# Patient Record
Sex: FEMALE | Race: BLACK OR AFRICAN AMERICAN | NOT HISPANIC OR LATINO | Employment: OTHER | URBAN - METROPOLITAN AREA
[De-identification: names, ages, dates, MRNs, and addresses within clinical notes are randomized per-mention and may not be internally consistent; named-entity substitution may affect disease eponyms.]

---

## 2018-09-24 ENCOUNTER — HOSPITAL ENCOUNTER (EMERGENCY)
Facility: HOSPITAL | Age: 43
Discharge: HOME | End: 2018-09-24
Attending: EMERGENCY MEDICINE
Payer: MEDICAID

## 2018-09-24 VITALS
SYSTOLIC BLOOD PRESSURE: 115 MMHG | HEIGHT: 66 IN | HEART RATE: 97 BPM | WEIGHT: 293 LBS | DIASTOLIC BLOOD PRESSURE: 82 MMHG | RESPIRATION RATE: 18 BRPM | TEMPERATURE: 99 F | OXYGEN SATURATION: 97 % | BODY MASS INDEX: 47.09 KG/M2

## 2018-09-24 DIAGNOSIS — J40 BRONCHITIS: Primary | ICD-10-CM

## 2018-09-24 DIAGNOSIS — J45.21 MILD INTERMITTENT ASTHMA WITH EXACERBATION: ICD-10-CM

## 2018-09-24 DIAGNOSIS — R05.9 COUGH: ICD-10-CM

## 2018-09-24 LAB
ANION GAP SERPL CALC-SCNC: 11 MMOL/L
B-HCG UR QL: NEGATIVE
BASOPHILS # BLD AUTO: 0.01 K/UL
BASOPHILS NFR BLD: 0.2 %
BUN SERPL-MCNC: 8 MG/DL
CALCIUM SERPL-MCNC: 9.5 MG/DL
CHLORIDE SERPL-SCNC: 100 MMOL/L
CO2 SERPL-SCNC: 27 MMOL/L
CREAT SERPL-MCNC: 0.8 MG/DL
CTP QC/QA: YES
DIFFERENTIAL METHOD: ABNORMAL
EOSINOPHIL # BLD AUTO: 0.3 K/UL
EOSINOPHIL NFR BLD: 4.5 %
ERYTHROCYTE [DISTWIDTH] IN BLOOD BY AUTOMATED COUNT: 16.1 %
EST. GFR  (AFRICAN AMERICAN): >60 ML/MIN/1.73 M^2
EST. GFR  (NON AFRICAN AMERICAN): >60 ML/MIN/1.73 M^2
GLUCOSE SERPL-MCNC: 90 MG/DL
HCT VFR BLD AUTO: 36.8 %
HGB BLD-MCNC: 11.5 G/DL
LYMPHOCYTES # BLD AUTO: 1.5 K/UL
LYMPHOCYTES NFR BLD: 26.9 %
MCH RBC QN AUTO: 27 PG
MCHC RBC AUTO-ENTMCNC: 31.3 G/DL
MCV RBC AUTO: 86 FL
MONOCYTES # BLD AUTO: 0.4 K/UL
MONOCYTES NFR BLD: 6.3 %
NEUTROPHILS # BLD AUTO: 3.4 K/UL
NEUTROPHILS NFR BLD: 61.9 %
PLATELET # BLD AUTO: 292 K/UL
PMV BLD AUTO: 9.8 FL
POCT GLUCOSE: 103 MG/DL (ref 70–110)
POTASSIUM SERPL-SCNC: 3.3 MMOL/L
RBC # BLD AUTO: 4.26 M/UL
SODIUM SERPL-SCNC: 138 MMOL/L
WBC # BLD AUTO: 5.54 K/UL

## 2018-09-24 PROCEDURE — 94761 N-INVAS EAR/PLS OXIMETRY MLT: CPT

## 2018-09-24 PROCEDURE — 25000003 PHARM REV CODE 250: Performed by: NURSE PRACTITIONER

## 2018-09-24 PROCEDURE — 80048 BASIC METABOLIC PNL TOTAL CA: CPT

## 2018-09-24 PROCEDURE — 82962 GLUCOSE BLOOD TEST: CPT

## 2018-09-24 PROCEDURE — 85025 COMPLETE CBC W/AUTO DIFF WBC: CPT

## 2018-09-24 PROCEDURE — 99284 EMERGENCY DEPT VISIT MOD MDM: CPT | Mod: 25

## 2018-09-24 PROCEDURE — 81025 URINE PREGNANCY TEST: CPT | Performed by: NURSE PRACTITIONER

## 2018-09-24 PROCEDURE — 25000242 PHARM REV CODE 250 ALT 637 W/ HCPCS: Performed by: NURSE PRACTITIONER

## 2018-09-24 PROCEDURE — 87040 BLOOD CULTURE FOR BACTERIA: CPT

## 2018-09-24 PROCEDURE — 94640 AIRWAY INHALATION TREATMENT: CPT

## 2018-09-24 RX ORDER — METFORMIN HYDROCHLORIDE 500 MG/1
500 TABLET ORAL 2 TIMES DAILY WITH MEALS
COMMUNITY

## 2018-09-24 RX ORDER — ALBUTEROL SULFATE 2.5 MG/.5ML
2.5 SOLUTION RESPIRATORY (INHALATION) EVERY 4 HOURS PRN
Qty: 12 EACH | Refills: 0 | Status: SHIPPED | OUTPATIENT
Start: 2018-09-24 | End: 2019-09-24

## 2018-09-24 RX ORDER — BENZONATATE 100 MG/1
100 CAPSULE ORAL 3 TIMES DAILY PRN
Qty: 20 CAPSULE | Refills: 0 | Status: SHIPPED | OUTPATIENT
Start: 2018-09-24 | End: 2018-10-04

## 2018-09-24 RX ORDER — IPRATROPIUM BROMIDE AND ALBUTEROL SULFATE 2.5; .5 MG/3ML; MG/3ML
3 SOLUTION RESPIRATORY (INHALATION)
Status: COMPLETED | OUTPATIENT
Start: 2018-09-24 | End: 2018-09-24

## 2018-09-24 RX ORDER — LORATADINE 10 MG/1
10 TABLET ORAL DAILY
Refills: 0 | COMMUNITY
Start: 2018-09-24 | End: 2019-09-24

## 2018-09-24 RX ORDER — ACETAMINOPHEN 500 MG
500 TABLET ORAL
Status: COMPLETED | OUTPATIENT
Start: 2018-09-24 | End: 2018-09-24

## 2018-09-24 RX ORDER — AZITHROMYCIN 250 MG/1
TABLET, FILM COATED ORAL
Qty: 6 TABLET | Refills: 0 | Status: SHIPPED | OUTPATIENT
Start: 2018-09-24

## 2018-09-24 RX ADMIN — IPRATROPIUM BROMIDE AND ALBUTEROL SULFATE 3 ML: .5; 3 SOLUTION RESPIRATORY (INHALATION) at 09:09

## 2018-09-24 RX ADMIN — ACETAMINOPHEN 500 MG: 500 TABLET ORAL at 07:09

## 2018-09-24 NOTE — ED PROVIDER NOTES
"Encounter Date: 2018       History     Chief Complaint   Patient presents with    Cough     43 year old female presents to ed cc of cough and congestion x 1 week. patient states prodcutive cough green sputum     43-year-old female with past medical history of asthma, hypertension, and HIV presents to the ED for a cough.  She reports a productive cough for about a week.  The cough is productive of yellow-green sputum.  She has been using her daughter's nebulizer without improvement.  She reports that her hand-held albuterol inhaler is not helping.  She reports chest pain with cough only.  No shortness of breath. No fever or chills. No vomiting or diarrhea.  She reports compliance with her HIV medications, but does not recall her last counts.  She states, "they told me it was good."  She is scheduled to see her HIV doctor on .  No known sick contacts.  No other complaints at this time. Pt is non-smoker.       The history is provided by the patient and medical records.   Cough   This is a new problem. The current episode started several days ago. The problem occurs every few minutes. The problem has been unchanged. The cough is productive of sputum. There has been no fever. Associated symptoms include chest pain (with cough only) and rhinorrhea. Pertinent negatives include no chills, no sweats, no headaches, no sore throat, no myalgias, no shortness of breath and no wheezing. Treatments tried: nebulizer. The treatment provided no relief. She is not a smoker. Her past medical history is significant for asthma. Her past medical history does not include COPD.     Review of patient's allergies indicates:  No Known Allergies  Past Medical History:   Diagnosis Date    Asthma     HIV disease     HIV positive     Hypertension      Past Surgical History:   Procedure Laterality Date     SECTION, CLASSIC       Family History   Problem Relation Age of Onset    Hypertension Mother     Diabetes " Mother     Hypertension Father     Diabetes Father      Social History     Tobacco Use    Smoking status: Current Every Day Smoker    Smokeless tobacco: Never Used   Substance Use Topics    Alcohol use: No    Drug use: No     Review of Systems   Constitutional: Positive for fatigue. Negative for activity change, appetite change, chills and fever.   HENT: Positive for congestion and rhinorrhea. Negative for sore throat.    Respiratory: Positive for cough. Negative for shortness of breath and wheezing.    Cardiovascular: Positive for chest pain (with cough only). Negative for palpitations and leg swelling.   Gastrointestinal: Negative for abdominal pain, diarrhea and vomiting.   Genitourinary: Negative for dysuria.   Musculoskeletal: Negative for back pain and myalgias.   Skin: Negative for rash.   Allergic/Immunologic: Positive for immunocompromised state.   Neurological: Negative for dizziness, weakness and headaches.   Hematological: Does not bruise/bleed easily.   Psychiatric/Behavioral: Negative for confusion.       Physical Exam     Initial Vitals [09/24/18 0707]   BP Pulse Resp Temp SpO2   134/76 97 20 99.4 °F (37.4 °C) 96 %      MAP       --         Physical Exam    Nursing note and vitals reviewed.  Constitutional: Vital signs are normal. She appears well-developed and well-nourished. She is Obese . She is active and cooperative. She is easily aroused.  Non-toxic appearance. She does not have a sickly appearance. She does not appear ill.   HENT:   Head: Normocephalic and atraumatic.   Right Ear: External ear normal.   Left Ear: External ear normal.   Nose: Rhinorrhea present.   Mouth/Throat: Uvula is midline, oropharynx is clear and moist and mucous membranes are normal.   Eyes: Conjunctivae and EOM are normal.   Neck: Normal range of motion. Neck supple.   Cardiovascular: Normal rate, regular rhythm and normal heart sounds.   Pulmonary/Chest: Effort normal. No accessory muscle usage. No tachypnea. She  has wheezes (few end expiratory bilaterally in bases).   Abdominal: Soft. Normal appearance and bowel sounds are normal. She exhibits no distension. There is no tenderness. There is no rigidity, no rebound and no guarding.   Musculoskeletal:        Right lower leg: Normal.        Left lower leg: Normal.   Neurological: She is alert, oriented to person, place, and time and easily aroused. She has normal strength. GCS eye subscore is 4. GCS verbal subscore is 5. GCS motor subscore is 6.   Skin: Skin is warm, dry and intact. No bruising and no rash noted. No erythema.   Psychiatric: She has a normal mood and affect. Her speech is normal and behavior is normal. Judgment and thought content normal. Cognition and memory are normal.         ED Course   Procedures  Labs Reviewed   CBC W/ AUTO DIFFERENTIAL - Abnormal; Notable for the following components:       Result Value    Hemoglobin 11.5 (*)     Hematocrit 36.8 (*)     MCHC 31.3 (*)     RDW 16.1 (*)     All other components within normal limits   BASIC METABOLIC PANEL - Abnormal; Notable for the following components:    Potassium 3.3 (*)     All other components within normal limits   CULTURE, BLOOD   CULTURE, BLOOD   POCT URINE PREGNANCY   POCT GLUCOSE          Imaging Results          X-Ray Chest PA And Lateral (Final result)  Result time 09/24/18 08:08:44    Final result by Stewart Verduzco MD (09/24/18 08:08:44)                 Impression:      No acute cardiopulmonary process.  No interval worsening.      Electronically signed by: Stewart Verduzco MD  Date:    09/24/2018  Time:    08:08             Narrative:    EXAMINATION:  XR CHEST PA AND LATERAL    CLINICAL HISTORY:  Cough    TECHNIQUE:  PA and lateral views of the chest were performed.    COMPARISON:  03/14/2015    FINDINGS:  Trachea is patent.  Cardiac silhouette appears normal in size.  Lung volumes are low.  No consolidation, mass, effusion, pneumothorax or free air below the diaphragm.  Osseous structures  "appear unremarkable.                                 Medical Decision Making:   Initial Assessment:   43-year-old female with past medical history of asthma, HIV, and hypertension presents ED for productive cough for 1 week.  No fever or chills. She reports no improvement with her albuterol inhaler, but very mild improvement with her daughter's nebulizer.  No vomiting or diarrhea.  She reports compliance with her HIV medications and that her last counts were "good."  Patient appears well, nontoxic.  Vitals stable.  No respiratory distress.  Lungs with faint expiratory wheezing in the bases bilaterally. Legs normal.  Differential Diagnosis:   URI, bronchitis, pneumonia, pleural effusion, CHF  Clinical Tests:   Lab Tests: Ordered and Reviewed  Radiological Study: Ordered and Reviewed  ED Management:  Labs, cxr, DuoNeb, po tylenol  Potassium mildly low at 3.3, but patient wanted to leave prior to receiving treatment as she has to  her children.  She denies chest pain and shortness of breath. She reports that the DuoNeb significantly helped her symptoms.  X-rays negative for infiltrate and acute change.  Feel the patient has bronchitis but due to history of immunosuppression, length of symptoms, and history of asthma, I will prescribe antibiotics.  I advised her to increase her fluid intake and follow up with her PCP within 2-3 days.  I reviewed strict return precautions including fever, chills, vomiting, chest pain, shortness of breath, or if she has any questions or concerns.  The patient has a nebulizer at home.  She does have her MDI inhaler as well. Patient verbalized understanding, compliance, and agreement with the treatment plan.  Rx Zyrtec, Zithromax, Tessalon Perles, albuterol nebulizer solution                      Clinical Impression:   The primary encounter diagnosis was Bronchitis. Diagnoses of Cough and Mild intermittent asthma with exacerbation were also pertinent to this visit.       "                       Lilliam Sultana, [facility]  09/24/18 1501

## 2018-09-24 NOTE — ED NOTES
APPEARANCE: Alert, oriented and in no acute distress.  CARDIAC: Normal rate and rhythm   PERIPHERAL VASCULAR: peripheral pulses present. Normal cap refill. No edema. Warm to touch.    RESPIRATORY: Respirations are equal and unlabored,+Cough noted   GASTRO: soft, bowel sounds normal, no tenderness, no abdominal distention.  MUSC: Full ROM. No bony tenderness or soft tissue tenderness. No obvious deformity.  SKIN: Skin is warm and dry, normal skin turgor, mucous membranes moist.  NEURO: 5/5 strength major flexors/extensors bilaterally. Sensory intact to light touch bilaterally. Henderson coma scale: eyes open spontaneously-4, oriented & converses-5, obeys commands-6. No neurological abnormalities.   MENTAL STATUS: awake, alert and aware of environment.  EYE: PERRL, both eyes: pupils brisk and reactive to light. Normal size..

## 2018-09-29 LAB
BACTERIA BLD CULT: NORMAL
BACTERIA BLD CULT: NORMAL

## 2021-04-18 ENCOUNTER — IMMUNIZATION (OUTPATIENT)
Dept: PRIMARY CARE CLINIC | Facility: CLINIC | Age: 46
End: 2021-04-18
Payer: MEDICAID

## 2021-04-18 DIAGNOSIS — Z23 NEED FOR VACCINATION: Primary | ICD-10-CM

## 2021-04-18 PROCEDURE — 0001A PR IMMUNIZ ADMIN, SARS-COV-2 COVID-19 VACC, 30MCG/0.3ML, 1ST DOSE: ICD-10-PCS | Mod: CV19,S$GLB,, | Performed by: INTERNAL MEDICINE

## 2021-04-18 PROCEDURE — 91300 PR SARS-COV- 2 COVID-19 VACCINE, NO PRSV, 30MCG/0.3ML, IM: ICD-10-PCS | Mod: S$GLB,,, | Performed by: INTERNAL MEDICINE

## 2021-04-18 PROCEDURE — 0001A PR IMMUNIZ ADMIN, SARS-COV-2 COVID-19 VACC, 30MCG/0.3ML, 1ST DOSE: CPT | Mod: CV19,S$GLB,, | Performed by: INTERNAL MEDICINE

## 2021-04-18 PROCEDURE — 91300 PR SARS-COV- 2 COVID-19 VACCINE, NO PRSV, 30MCG/0.3ML, IM: CPT | Mod: S$GLB,,, | Performed by: INTERNAL MEDICINE

## 2021-04-18 RX ADMIN — Medication 0.3 ML: at 01:04

## 2021-05-08 ENCOUNTER — IMMUNIZATION (OUTPATIENT)
Dept: PRIMARY CARE CLINIC | Facility: CLINIC | Age: 46
End: 2021-05-08
Payer: MEDICAID

## 2021-05-08 DIAGNOSIS — Z23 NEED FOR VACCINATION: Primary | ICD-10-CM

## 2021-05-08 PROCEDURE — 0002A PR IMMUNIZ ADMIN, SARS-COV-2 COVID-19 VACC, 30MCG/0.3ML, 2ND DOSE: CPT | Mod: PBBFAC

## 2021-05-08 PROCEDURE — 91300 PR SARS-COV- 2 COVID-19 VACCINE, NO PRSV, 30MCG/0.3ML, IM: CPT | Mod: PBBFAC

## 2021-05-08 RX ADMIN — RNA INGREDIENT BNT-162B2 0.3 ML: 0.23 INJECTION, SUSPENSION INTRAMUSCULAR at 10:05

## 2021-11-15 ENCOUNTER — IMMUNIZATION (OUTPATIENT)
Dept: PRIMARY CARE CLINIC | Facility: CLINIC | Age: 46
End: 2021-11-15
Payer: MEDICAID

## 2021-11-15 DIAGNOSIS — Z23 NEED FOR VACCINATION: Primary | ICD-10-CM

## 2021-11-15 PROCEDURE — 0004A COVID-19, MRNA, LNP-S, PF, 30 MCG/0.3 ML DOSE VACCINE: CPT | Mod: CV19,PBBFAC | Performed by: INTERNAL MEDICINE

## 2022-12-05 NOTE — DISCHARGE INSTRUCTIONS
Increase your fluid intake.  Return to the ED if your condition changes, progresses, or if you have any concerns.     yes

## 2023-06-05 ENCOUNTER — HOSPITAL ENCOUNTER (EMERGENCY)
Facility: HOSPITAL | Age: 48
Discharge: HOME | End: 2023-06-05
Attending: EMERGENCY MEDICINE
Payer: MEDICAID

## 2023-06-05 VITALS
HEART RATE: 76 BPM | RESPIRATION RATE: 15 BRPM | SYSTOLIC BLOOD PRESSURE: 135 MMHG | HEIGHT: 66 IN | WEIGHT: 293 LBS | DIASTOLIC BLOOD PRESSURE: 81 MMHG | TEMPERATURE: 98 F | OXYGEN SATURATION: 100 % | BODY MASS INDEX: 47.09 KG/M2

## 2023-06-05 DIAGNOSIS — G43.109 COMPLICATED MIGRAINE: ICD-10-CM

## 2023-06-05 DIAGNOSIS — R20.2 PARESTHESIAS: ICD-10-CM

## 2023-06-05 DIAGNOSIS — R51.9 ACUTE NONINTRACTABLE HEADACHE, UNSPECIFIED HEADACHE TYPE: Primary | ICD-10-CM

## 2023-06-05 DIAGNOSIS — I10 HYPERTENSION, UNSPECIFIED TYPE: ICD-10-CM

## 2023-06-05 DIAGNOSIS — R47.1 DYSARTHRIA: ICD-10-CM

## 2023-06-05 LAB
ALBUMIN SERPL BCP-MCNC: 3.3 G/DL (ref 3.5–5.2)
ALLENS TEST: NORMAL
ALLENS TEST: NORMAL
ALP SERPL-CCNC: 80 U/L (ref 55–135)
ALT SERPL W/O P-5'-P-CCNC: 13 U/L (ref 10–44)
ANION GAP SERPL CALC-SCNC: 7 MMOL/L (ref 8–16)
AST SERPL-CCNC: 13 U/L (ref 10–40)
BASOPHILS # BLD AUTO: 0.02 K/UL (ref 0–0.2)
BASOPHILS NFR BLD: 0.3 % (ref 0–1.9)
BILIRUB SERPL-MCNC: 0.1 MG/DL (ref 0.1–1)
BUN SERPL-MCNC: 8 MG/DL (ref 6–20)
CALCIUM SERPL-MCNC: 9.6 MG/DL (ref 8.7–10.5)
CHLORIDE SERPL-SCNC: 107 MMOL/L (ref 95–110)
CHOLEST SERPL-MCNC: 204 MG/DL (ref 120–199)
CHOLEST/HDLC SERPL: 3.4 {RATIO} (ref 2–5)
CO2 SERPL-SCNC: 28 MMOL/L (ref 23–29)
CREAT SERPL-MCNC: 0.9 MG/DL (ref 0.5–1.4)
CREAT SERPL-MCNC: 0.9 MG/DL (ref 0.5–1.4)
DELSYS: NORMAL
DELSYS: NORMAL
DIFFERENTIAL METHOD: ABNORMAL
EOSINOPHIL # BLD AUTO: 0.2 K/UL (ref 0–0.5)
EOSINOPHIL NFR BLD: 2.7 % (ref 0–8)
ERYTHROCYTE [DISTWIDTH] IN BLOOD BY AUTOMATED COUNT: 16.1 % (ref 11.5–14.5)
EST. GFR  (NO RACE VARIABLE): >60 ML/MIN/1.73 M^2
GLUCOSE SERPL-MCNC: 102 MG/DL (ref 70–110)
HCT VFR BLD AUTO: 35.8 % (ref 37–48.5)
HDLC SERPL-MCNC: 60 MG/DL (ref 40–75)
HDLC SERPL: 29.4 % (ref 20–50)
HGB BLD-MCNC: 11.1 G/DL (ref 12–16)
IMM GRANULOCYTES # BLD AUTO: 0.02 K/UL (ref 0–0.04)
IMM GRANULOCYTES NFR BLD AUTO: 0.3 % (ref 0–0.5)
INR PPP: 1 (ref 0.8–1.2)
LDLC SERPL CALC-MCNC: 130.6 MG/DL (ref 63–159)
LYMPHOCYTES # BLD AUTO: 3.2 K/UL (ref 1–4.8)
LYMPHOCYTES NFR BLD: 53.1 % (ref 18–48)
MCH RBC QN AUTO: 26.1 PG (ref 27–31)
MCHC RBC AUTO-ENTMCNC: 31 G/DL (ref 32–36)
MCV RBC AUTO: 84 FL (ref 82–98)
MODE: NORMAL
MODE: NORMAL
MONOCYTES # BLD AUTO: 0.5 K/UL (ref 0.3–1)
MONOCYTES NFR BLD: 7.5 % (ref 4–15)
NEUTROPHILS # BLD AUTO: 2.2 K/UL (ref 1.8–7.7)
NEUTROPHILS NFR BLD: 36.1 % (ref 38–73)
NONHDLC SERPL-MCNC: 144 MG/DL
NRBC BLD-RTO: 0 /100 WBC
PLATELET # BLD AUTO: 298 K/UL (ref 150–450)
PMV BLD AUTO: 10.5 FL (ref 9.2–12.9)
POC PTINR: 1.1 (ref 0.9–1.2)
POCT GLUCOSE: 89 MG/DL (ref 70–110)
POTASSIUM SERPL-SCNC: 3.9 MMOL/L (ref 3.5–5.1)
PROT SERPL-MCNC: 7.8 G/DL (ref 6–8.4)
PROTHROMBIN TIME: 10.6 SEC (ref 9–12.5)
RBC # BLD AUTO: 4.25 M/UL (ref 4–5.4)
SAMPLE: NORMAL
SAMPLE: NORMAL
SITE: NORMAL
SITE: NORMAL
SODIUM SERPL-SCNC: 142 MMOL/L (ref 136–145)
TRIGL SERPL-MCNC: 67 MG/DL (ref 30–150)
TSH SERPL DL<=0.005 MIU/L-ACNC: 3.84 UIU/ML (ref 0.4–4)
WBC # BLD AUTO: 5.97 K/UL (ref 3.9–12.7)

## 2023-06-05 PROCEDURE — 99499 UNLISTED E&M SERVICE: CPT | Mod: ,,, | Performed by: PSYCHIATRY & NEUROLOGY

## 2023-06-05 PROCEDURE — 93005 ELECTROCARDIOGRAM TRACING: CPT

## 2023-06-05 PROCEDURE — 99204 OFFICE O/P NEW MOD 45 MIN: CPT | Mod: 95,,, | Performed by: PSYCHIATRY & NEUROLOGY

## 2023-06-05 PROCEDURE — 99900035 HC TECH TIME PER 15 MIN (STAT)

## 2023-06-05 PROCEDURE — 85610 PROTHROMBIN TIME: CPT

## 2023-06-05 PROCEDURE — 99285 EMERGENCY DEPT VISIT HI MDM: CPT | Mod: 25

## 2023-06-05 PROCEDURE — 93010 ELECTROCARDIOGRAM REPORT: CPT | Mod: ,,, | Performed by: INTERNAL MEDICINE

## 2023-06-05 PROCEDURE — 99499 NO LOS: ICD-10-PCS | Mod: ,,, | Performed by: PSYCHIATRY & NEUROLOGY

## 2023-06-05 PROCEDURE — 80053 COMPREHEN METABOLIC PANEL: CPT | Performed by: EMERGENCY MEDICINE

## 2023-06-05 PROCEDURE — 80061 LIPID PANEL: CPT | Performed by: EMERGENCY MEDICINE

## 2023-06-05 PROCEDURE — 63600175 PHARM REV CODE 636 W HCPCS: Performed by: EMERGENCY MEDICINE

## 2023-06-05 PROCEDURE — 96361 HYDRATE IV INFUSION ADD-ON: CPT

## 2023-06-05 PROCEDURE — 25500020 PHARM REV CODE 255: Performed by: EMERGENCY MEDICINE

## 2023-06-05 PROCEDURE — 93010 EKG 12-LEAD: ICD-10-PCS | Mod: ,,, | Performed by: INTERNAL MEDICINE

## 2023-06-05 PROCEDURE — 85610 PROTHROMBIN TIME: CPT | Performed by: EMERGENCY MEDICINE

## 2023-06-05 PROCEDURE — 82565 ASSAY OF CREATININE: CPT | Mod: 91

## 2023-06-05 PROCEDURE — 96375 TX/PRO/DX INJ NEW DRUG ADDON: CPT | Mod: 59

## 2023-06-05 PROCEDURE — 96374 THER/PROPH/DIAG INJ IV PUSH: CPT | Mod: 59

## 2023-06-05 PROCEDURE — 82962 GLUCOSE BLOOD TEST: CPT

## 2023-06-05 PROCEDURE — 25000003 PHARM REV CODE 250: Performed by: EMERGENCY MEDICINE

## 2023-06-05 PROCEDURE — 85025 COMPLETE CBC W/AUTO DIFF WBC: CPT | Performed by: EMERGENCY MEDICINE

## 2023-06-05 PROCEDURE — 84443 ASSAY THYROID STIM HORMONE: CPT | Performed by: EMERGENCY MEDICINE

## 2023-06-05 PROCEDURE — 99204 PR OFFICE/OUTPT VISIT, NEW, LEVL IV, 45-59 MIN: ICD-10-PCS | Mod: 95,,, | Performed by: PSYCHIATRY & NEUROLOGY

## 2023-06-05 RX ORDER — NAPROXEN SODIUM 220 MG/1
81 TABLET, FILM COATED ORAL DAILY
Status: DISCONTINUED | OUTPATIENT
Start: 2023-06-05 | End: 2023-06-05

## 2023-06-05 RX ORDER — ASPIRIN 81 MG/1
81 TABLET ORAL DAILY
Qty: 30 TABLET | Refills: 0 | Status: SHIPPED | OUTPATIENT
Start: 2023-06-05

## 2023-06-05 RX ORDER — PROCHLORPERAZINE EDISYLATE 5 MG/ML
5 INJECTION INTRAMUSCULAR; INTRAVENOUS
Status: COMPLETED | OUTPATIENT
Start: 2023-06-05 | End: 2023-06-05

## 2023-06-05 RX ORDER — DIPHENHYDRAMINE HYDROCHLORIDE 50 MG/ML
12.5 INJECTION INTRAMUSCULAR; INTRAVENOUS
Status: COMPLETED | OUTPATIENT
Start: 2023-06-05 | End: 2023-06-05

## 2023-06-05 RX ADMIN — DIPHENHYDRAMINE HYDROCHLORIDE 12.5 MG: 50 INJECTION INTRAMUSCULAR; INTRAVENOUS at 03:06

## 2023-06-05 RX ADMIN — PROCHLORPERAZINE EDISYLATE 5 MG: 5 INJECTION INTRAMUSCULAR; INTRAVENOUS at 03:06

## 2023-06-05 RX ADMIN — IOHEXOL 100 ML: 350 INJECTION, SOLUTION INTRAVENOUS at 03:06

## 2023-06-05 RX ADMIN — SODIUM CHLORIDE 250 ML: 0.9 INJECTION, SOLUTION INTRAVENOUS at 03:06

## 2023-06-05 NOTE — SUBJECTIVE & OBJECTIVE
"  Woke up with symptoms?: no    Recent bleeding noted: no  Does the patient take any Blood Thinners? no  Medications: No relevant medications      Past Medical History: no relevant history    Past Surgical History: no major surgeries within the last 2 weeks    Family History: no relevant history    Social History: unable to obtain    Allergies: No Known Allergies No relevant allergies    Review of Systems  Objective:   Vitals: Blood pressure (!) 199/123, pulse 80, temperature 98 °F (36.7 °C), temperature source Oral, resp. rate 20, height 5' 6" (1.676 m), weight 136.1 kg (300 lb), SpO2 98 %. BP: 199/123    CT READ: Yes  No hemmorhage. No mass effect. No early infarct signs.     Physical Exam      "

## 2023-06-05 NOTE — ED PROVIDER NOTES
Emergency Department Encounter  Provider Note    Lexy Lowe  [id number]  2023    Evaluation:    History:     Chief Complaint   Patient presents with    Aphasia     Tongue feels heavy, hard to speak and headache that started around 10 pm.    Headache       History of Present Illness:  Lexy Lowe is a 48 y.o. female who has a past medical history of Asthma, HIV disease, HIV positive, and Hypertension.    The patient presents to the ED due to headache, slurred speech, weakness, tingling/numbness.    Patient presents with family, who state patient began complaining of headache around 22:00 yesterday evening.     On arrival, patient expressing slurred speech as well as R-sided tingling and weakness in her R arm. She states she feels tired. She is not sure if she has ever had symptoms like this before.    Patient is a very poor historian. History is limited. Chart review completed.         Past Medical History:   Diagnosis Date    Asthma     HIV disease     HIV positive     Hypertension      Past Surgical History:   Procedure Laterality Date     SECTION, CLASSIC       Family History   Problem Relation Age of Onset    Hypertension Mother     Diabetes Mother     Hypertension Father     Diabetes Father      Social History     Socioeconomic History    Marital status: Single   Tobacco Use    Smoking status: Every Day    Smokeless tobacco: Never   Substance and Sexual Activity    Alcohol use: No    Drug use: No     Review of patient's allergies indicates:  No Known Allergies    Review of Systems   Neurological:  Positive for weakness and headaches.     Physical Exam:     Initial Vitals [23 0259]   BP Pulse Resp Temp SpO2   (!) 199/123 80 20 98 °F (36.7 °C) 98 %      MAP       --         Physical Exam    Nursing note and vitals reviewed.  Constitutional: She appears well-developed and well-nourished. She is not diaphoretic. She appears distressed.   HENT:   Head: Normocephalic and atraumatic.    Mouth/Throat: Oropharynx is clear and moist.   Eyes: EOM are normal. Pupils are equal, round, and reactive to light.   Neck: No tracheal deviation present.   Cardiovascular:  Normal rate, regular rhythm, normal heart sounds and intact distal pulses.           Pulmonary/Chest: Breath sounds normal. No stridor. No respiratory distress. She has no wheezes.   Abdominal: Abdomen is soft. Bowel sounds are normal. She exhibits no distension and no mass. There is no abdominal tenderness.   Musculoskeletal:         General: No edema. Normal range of motion.     Neurological: She is alert and oriented to person, place, and time. She has normal strength. She is not disoriented. A cranial nerve deficit is present. No sensory deficit. She exhibits normal muscle tone. Coordination normal. GCS eye subscore is 4. GCS verbal subscore is 5. GCS motor subscore is 6.   Speech slightly slurred, though patient does seem to have periods of clear speech between slurred and dysarthric speech.  No facial droop.   Squinting eyes closed constantly during exam.   Very poor effort on strength testing, but appears to move all extremities with symmetric strength and without drift.  Endorses subjective diminished sensation to R arm and R leg.    Skin: Skin is warm and dry. Capillary refill takes less than 2 seconds. No pallor.   Psychiatric: She has a normal mood and affect. Her behavior is normal. Thought content normal.       ED Course:   Critical Care    Date/Time: 6/5/2023 4:53 AM  Performed by: Mike Camacho MD  Authorized by: Mike Camacho MD   Total critical care time (exclusive of procedural time) : 45 minutes  Critical care was necessary to treat or prevent imminent or life-threatening deterioration of the following conditions: acute neurologic deficit, code stroke.  Critical care was time spent personally by me on the following activities: discussions with consultants, examination of patient, obtaining history from patient or  surrogate, ordering and performing treatments and interventions, ordering and review of laboratory studies, ordering and review of radiographic studies, pulse oximetry and re-evaluation of patient's condition.        Medical Decision Making:    History Acquisition:   Additional historians utilized:  None     Prior medical records were reviewed:   ID visit 11/2022 for f/u HIV  Optometry visit 04/2019 for myopia  Podiatry visit 09/2018 for plantar fasciitis    The patient's list of active medical problems, social history, medications, and allergies as documented has been reviewed.     Differential Diagnoses:   Based on available information and initial assessment, Differential Diagnosis includes, but is not limited to:  CVA/TIA, intracranial mass/hemorrhage, head trauma, seizure, status epilepticus, post-ictal state, meningitis/encephalitis, sepsis, MI/ACS, arrhythmia, syncope,   anaphylaxis, thyroid disease, neuroleptic malignant syndrome, serotonin syndrome, CO poisoning, hypoxia/hypercapnea, uremic/hepatic encephalopathy, medication reaction, intentional overdose, metabolic derangement, psychiatric disturbance, substance abuse, alcohol intoxication/withdrawal, hypoglycemia/hyperglycemia, complicated migraine.        EKG:   EKG interpretation by ED attending physician:  NSR, rate 74, no ST changes, no ischemia, normal intervals.  Compared with prior EKG dated 06/2015, grossly stable without significant change.      Labs:     Labs Reviewed   CBC W/ AUTO DIFFERENTIAL - Abnormal; Notable for the following components:       Result Value    Hemoglobin 11.1 (*)     Hematocrit 35.8 (*)     MCH 26.1 (*)     MCHC 31.0 (*)     RDW 16.1 (*)     Gran % 36.1 (*)     Lymph % 53.1 (*)     All other components within normal limits   COMPREHENSIVE METABOLIC PANEL - Abnormal; Notable for the following components:    Albumin 3.3 (*)     Anion Gap 7 (*)     All other components within normal limits   LIPID PANEL - Abnormal; Notable for  the following components:    Cholesterol 204 (*)     All other components within normal limits   PROTIME-INR   TSH   POCT GLUCOSE   ISTAT PROCEDURE   ISTAT CREATININE     Independent review of the labs ordered include:   See ED course    Imaging:     Imaging Results              CTA Head and Neck (xpd) (Final result)  Result time 06/05/23 04:56:45      Final result by Víctor Amaya MD (06/05/23 04:56:45)                   Impression:      No acute abnormality.    No high-grade stenosis or major vessel occlusion.      Electronically signed by: Víctor Amaya  Date:    06/05/2023  Time:    04:56               Narrative:    EXAMINATION:  CTA HEAD AND NECK (XPD)    CLINICAL HISTORY:  Stroke/TIA, determine embolic source;    TECHNIQUE:  Non contrast low dose axial images were obtained through the head. CT angiogram was performed from the level of the conor to the top of the head following the IV administration of 100mL of Omnipaque 350.   Sagittal and coronal reconstructions and maximum intensity projection reconstructions were performed. Arterial stenosis percentages are based on NASCET measurement criteria.  Rapid AI was used.    COMPARISON:  None    FINDINGS:  Intracranial Compartment:    Ventricles and sulci are normal in size for age without evidence of hydrocephalus. No extra-axial blood or fluid collections.    The brain parenchyma appears normal. No parenchymal mass, hemorrhage, edema, or major vascular distribution infarct.    Skull/Extracranial Contents (limited evaluation): No fracture. Mastoid air cells and paranasal sinuses are essentially clear.    Non-Vascular Structures of the Neck/Thoracic Inlet (limited evaluation): Normal.    Aorta: Normal 3 vessel arch.    Extracranial carotid circulation: No hemodynamically significant stenosis, aneurysmal dilatation, or dissection.    Extracranial vertebral circulation: No hemodynamically significant stenosis, aneurysmal dilatation, or  dissection.    Intracranial Arteries: No focal high-grade stenosis, occlusion, or aneurysm.    Venous structures (limited evaluation): Normal.                                       X-Ray Chest AP Portable (Final result)  Result time 06/05/23 03:40:50      Final result by Gabino Iglesias MD (06/05/23 03:40:50)                   Impression:      There is no radiographic evidence for acute intrathoracic process.      Electronically signed by: Gabino Iglesias  Date:    06/05/2023  Time:    03:40               Narrative:    EXAMINATION:  XR CHEST AP PORTABLE    CLINICAL HISTORY:  Stroke;    TECHNIQUE:  Single frontal view of the chest was performed.    COMPARISON:  Chest radiograph September 24, 2018    FINDINGS:  Single portable chest view is submitted.  There is mild diminished depth of inspiration and minimal rotation, when accounting for position, technique and depth of inspiration the appearance of the cardiomediastinal silhouette is stable.  There is general pattern of accentuated attenuation consistent with soft tissue attenuation associated with body habitus.    Mild accentuation of pulmonary bronchovascular markings consistent with mild diminished depth of inspiration noted.  There is no evidence for confluent infiltrate or consolidation, significant pleural effusion or pneumothorax.    The osseous structures demonstrate chronic change.                                       CT Head Without Contrast (Final result)  Result time 06/05/23 03:24:31      Final result by Víctor Amaya MD (06/05/23 03:24:31)                   Impression:      No acute abnormality.      Electronically signed by: Víctor Amaya  Date:    06/05/2023  Time:    03:24               Narrative:    EXAMINATION:  CT HEAD WITHOUT CONTRAST    CLINICAL HISTORY:  Neuro deficit, acute, stroke suspected;    TECHNIQUE:  Low dose axial CT images obtained throughout the head without intravenous contrast. Sagittal and coronal reconstructions were  performed.    COMPARISON:  None.    FINDINGS:  Intracranial compartment:    Ventricles and sulci are normal in size for age without evidence of hydrocephalus. No extra-axial blood or fluid collections.    The brain parenchyma appears normal. No parenchymal mass, hemorrhage, edema or major vascular distribution infarct.    Skull/extracranial contents (limited evaluation): No fracture. Mastoids are clear.  Minimal scattered paranasal sinus disease in the left sphenoid and right left maxillary sinus.  Periapical lucency in the left maxilla likely Joceline apical dental disease near tooth 13                                         Additional Consideration:   Additional testing considered during clinical course: CTA     Social determinants of health considered during development of treatment plan include: poor access to care     Current co-morbidities considered which impacted clinical decision making: HIV    Case discussed with additional provider: Vascular Neurology Dr. Tian, CTA recommended, patient not TNK candidate     Medications   prochlorperazine injection Soln 5 mg (5 mg Intravenous Given 6/5/23 0326)   diphenhydrAMINE injection 12.5 mg (12.5 mg Intravenous Given 6/5/23 0326)   sodium chloride 0.9% bolus 250 mL 250 mL (0 mLs Intravenous Stopped 6/5/23 0425)   iohexoL (OMNIPAQUE 350) injection 100 mL (100 mLs Intravenous Given 6/5/23 0351)        ED Course as of 06/05/23 1419   Mon Jun 05, 2023   0454 SpO2: 98 % [SS]   0454 Resp: 20 [SS]   0454 Pulse: 80 [SS]   0454 Temp: 98 °F (36.7 °C) [SS]   0454 BP(!): 199/123  Hypertensive on arrival  [SS]   0454 POCT glucose  Glucose normal  [SS]   0454 ISTAT PROCEDURE  INR normal [SS]   0454 ISTAT CREATININE  Cr normal  [SS]   0454 CBC W/ AUTO DIFFERENTIAL(!)  Unremarkable  [SS]   0454 Protime-INR  WNL [SS]   0454 Comprehensive metabolic panel(!)  Unremarkable  [SS]   0454 LDL - Lipid Panel(!)  Cholesterol eelvated  [SS]   0454 TSH  WNL [SS]   0454 CT Head Without Contrast  CT  head independently interpreted: no intracranial hemorrhage, mass effect, or midline shift.  Agree with radiologist interpretation.    [SS]   0455 X-Ray Chest AP Portable  CXR independently interpreted: no focal infiltrate, effusion, edema, free air, or other acute process  Agree with radiologist interpretation.    [SS]   0455 CTA Head and Neck (xpd)  CT head independently interpreted: no LVO, intracranial hemorrhage, mass effect, or midline shift.  Agree with radiologist interpretation.    [SS]   0521 BP: 137/86  BP significantly improved on reassessment. Patient resting comfortably.   Workup reassuring. She states she is feeling much better and currently feels sleepy.  Will DC with supportive care and referral to Neurology for further evaluation of possible complex migraines.  [SS]   0684 Vascular Neurology states discharge on 81mg ASA, if symptoms persist or she returns perform MRI, pt has no acute concerns at this time. [CD]      ED Course User Index  [CD] Pollo Baez DO  [SS] Mike Camacho MD            Additional MDM:     NIH Stroke Scale:   Interval = baseline (upon arrival/admit)  Level of consciousness = 1 - drowsy  LOC questions = 0 - answers both correctly  LOC commands = 0 - performs both correctly  Best gaze = 0 - normal  Visual = 0 - no visual loss  Facial palsy = 0 - normal  Motor left arm =  0 - no drift  Motor right arm =  0 - no drift  Motor left leg = 0 - no drift  Motor right leg =  0 - no drift  Limb ataxia = 0 - absent  Sensory = 1 - mild to moderate loss  Best language = 0 - no aphasia  Dysarthria = 1 - mild to moderate dysarthria  Extinction and inattention = 0 - no neglect  NIH Stroke Scale Total = 3        Clinical Impression:       ICD-10-CM ICD-9-CM   1. Acute nonintractable headache, unspecified headache type  R51.9 784.0   2. Hypertension, unspecified type  I10 401.9   3. Dysarthria  R47.1 784.51   4. Paresthesias  R20.2 782.0   5. Complicated migraine  G43.109 346.00          Follow-up Information       Follow up With Specialties Details Why Contact Info Additional Information    Your Primary Care Provider  Schedule an appointment as soon as possible for a visit   as soon as able     [facility] Schedule an appointment as soon as possible for a visit   [address] [place], [address]  [facility] [address] [phone] Please park in [address] or D and use [address]. Take Medical Office Bldg. elevators.             ED Disposition Condition    Discharge Stable               Mike Camacho MD  06/05/23 0734

## 2023-06-05 NOTE — CONSULTS
[facility]  Vascular Neurology  Comprehensive Stroke Center  TeleVascular Neurology Acute Consultation Note      Consults    Consulting Provider: BLANCO GAN  Current Providers  No providers found    Patient Location:  [facility] EMERGENCY DEPARTMENT Emergency Department  Spoke hospital nurse at bedside with patient assisting consultant.     Patient information was obtained from patient.         Assessment/Plan:   symptoms: Dysarthria, headache; LKN 6/4/2023 2200. Initial NIHSS-3, Follow-up NIHSS-0. DDX: TIA/stroke/hypertensive urgency      CT head no acute intra-cranial process.     Oral aspirin 81 mg, if PO not possible then rectal aspirin 300 mg now.  Head of bed flat, IV Fluids, permissive hypertension  CTA head and neck with and without contrast.  Neuro consult if in house available or transfer for neuro consult  Stroke/TIA work-up with MRI brain, Echo, PT/OT/ Speech and swallow evaluation.  MRI brain, inpatient vs outpatient and Neurology follow-up clinic.      Diagnoses:   Stroke like symptoms    STROKE DOCUMENTATION     Acute Stroke Times:   Acute Stroke Times   Last Known Normal Date: 06/04/23  Last Known Normal Time: 2200  Symptom Onset Date: 06/05/23  Symptom Onset Time: 2200  Stroke Team Called Date: 06/05/23  Stroke Team Called Time: 0313  Stroke Team Arrival Date: 06/05/23  Stroke Team Arrival Time: 0313  Thrombolytic Therapy Recommended: No    NIH Scale:  1a. Level of Consciousness: 1-->Not alert, but arousable by minor stimulation to obey, answer, or respond  1b. LOC Questions: 0-->Answers both questions correctly  1c. LOC Commands: 0-->Performs both tasks correctly  2. Best Gaze: 0-->Normal  3. Visual: 0-->No visual loss  4. Facial Palsy: 0-->Normal symmetrical movements  5a. Motor Arm, Left: 0-->No drift, limb holds 90 (or 45) degrees for full 10 secs  5b. Motor Arm, Right: 0-->No drift, limb holds 90 (or 45) degrees for full 10 secs  6a. Motor Leg, Left: 0-->No  "drift, leg holds 30 degree position for full 5 secs  6b. Motor Leg, Right: 0-->No drift, leg holds 30 degree position for full 5 secs  7. Limb Ataxia: 0-->Absent  8. Sensory: 1-->Mild-to-moderate sensory loss, patient feels pinprick is less sharp or is dull on the affected side, or there is a loss of superficial pain with pinprick, but patient is aware of being touched  9. Best Language: 0-->No aphasia, normal  10. Dysarthria: 1-->Mild-to-moderate dysarthria, patient slurs at least some words and, at worst, can be understood with some difficulty  11. Extinction and Inattention (formerly Neglect): 0-->No abnormality  Total (NIH Stroke Scale): 3     Modified Opdyke    Richmond Coma Scale:    ABCD2 Score:    [id number] Score:   HAS -BLED Score:   ICH Score:   Hunt & Ortega Classification:       Blood pressure (!) 199/123, pulse 80, temperature 98 °F (36.7 °C), temperature source Oral, resp. rate 20, height 5' 6" (1.676 m), weight 136.1 kg (300 lb), SpO2 98 %.  Eligible for thrombolytic therapy?: Yes  Thrombolytic therapy recomended: Thrombolytic therapy not recommended due to Outside of treatment window   Possible Interventional Revascularization Candidate? No; No large vessel occlusion identified on imaging     Disposition Recommendation: pending further studies    Subjective:     History of Present Illness:  symptoms: Dysarthria, headache; LKN 6/4/2023 2200      Woke up with symptoms?: no    Recent bleeding noted: no  Does the patient take any Blood Thinners? no  Medications: No relevant medications      Past Medical History: no relevant history    Past Surgical History: no major surgeries within the last 2 weeks    Family History: no relevant history    Social History: unable to obtain    Allergies: No Known Allergies No relevant allergies    Review of Systems  Objective:   Vitals: Blood pressure (!) 199/123, pulse 80, temperature 98 °F (36.7 °C), temperature source Oral, resp. rate 20, height 5' 6" (1.676 m), weight " 136.1 kg (300 lb), SpO2 98 %. BP: 199/123    CT READ: Yes  No hemmorhage. No mass effect. No early infarct signs.     Physical Exam        Recommended the emergency room physician to have a brief discussion with the patient and/or family if available regarding the  risks and benefits of treatment, and to briefly document the occurrence of that discussion in his clinical encounter note.     The attending portion of this evaluation, treatment, and documentation was performed per Waqar Foreman MD via audiovisual.    Billing code:  (non-intervention mild to moderate stroke, TIA, some mimics)      This patient has a critical neurological condition/illness, with some potential for high morbidity and mortality.  There is a moderate probability for acute neurological change leading to clinical and possibly life-threatening deterioration requiring highest level of physician preparedness for urgent intervention.  Care was coordinated with other physicians involved in the patient's care.  Radiologic studies and laboratory data were reviewed and interpreted, and plan of care was re-assessed based on the results.  Diagnosis, treatment options and prognosis may have been discussed with the patient and/or family members or caregiver.    In your opinion, this was a: Tier 1 Van Negative    Consult End Time: 2:17 PM     Waqar Foreman MD  [facility]  Vascular Neurology   [facility]

## 2023-06-05 NOTE — DISCHARGE INSTRUCTIONS
Thank you for choosing [facility]!     Our goal in the Emergency Department is to always provide outstanding medical care. You may receive a survey by mail or e-mail in the next week regarding your experience today. We would greatly appreciate you completing and returning the survey. Your feedback provides us with a way to recognize our staff who provide very good care, and it helps us learn how to improve when your experience was below our aspiration of excellence.      It is important to remember that some problems are difficult to diagnose and may not be found during your first visit. Be sure to follow up with your primary care doctor and review any labs/imaging that was performed during your visit with them. If you do not have a primary care doctor, you may contact the one listed on your discharge paperwork, or you may also call the [facility] Appointment Desk at [phone] to schedule an appointment.     All medications may potentially have side effects and it is impossible to predict which medications may give you side effects. If you feel that you are having a negative effect of any medication you should immediately stop taking them and seek medical attention.  Do not drive or make any important decisions for 24 hours if you have received any pain medications, sedatives or mood altering drugs during your ER visit.    We appreciate you trusting us with your medical care. We will be happy to take care of you for all of your future medical needs. You may return to the ER at any time for any new/concerning symptoms, worsening condition, or failure to improve. We hope you feel better soon.     Sincerely,    Mike Camacho Jr., MD  Board-Certified Emergency Medicine Physician  [facility]

## 2024-08-02 ENCOUNTER — HOSPITAL ENCOUNTER (EMERGENCY)
Facility: HOSPITAL | Age: 49
Discharge: HOME | End: 2024-08-02
Attending: EMERGENCY MEDICINE
Payer: MEDICAID

## 2024-08-02 VITALS
HEIGHT: 66 IN | BODY MASS INDEX: 47.09 KG/M2 | OXYGEN SATURATION: 98 % | RESPIRATION RATE: 19 BRPM | HEART RATE: 84 BPM | DIASTOLIC BLOOD PRESSURE: 99 MMHG | TEMPERATURE: 99 F | WEIGHT: 293 LBS | SYSTOLIC BLOOD PRESSURE: 164 MMHG

## 2024-08-02 DIAGNOSIS — R07.9 CHEST PAIN: Primary | ICD-10-CM

## 2024-08-02 DIAGNOSIS — R29.818 ACUTE FOCAL NEUROLOGICAL DEFICIT: ICD-10-CM

## 2024-08-02 PROBLEM — R47.9 DIFFICULTY WITH SPEECH: Status: ACTIVE | Noted: 2024-08-02

## 2024-08-02 LAB
ALBUMIN SERPL BCP-MCNC: 3.3 G/DL (ref 3.5–5.2)
ALP SERPL-CCNC: 74 U/L (ref 55–135)
ALT SERPL W/O P-5'-P-CCNC: 15 U/L (ref 10–44)
ANION GAP SERPL CALC-SCNC: 6 MMOL/L (ref 8–16)
AST SERPL-CCNC: 22 U/L (ref 10–40)
BASOPHILS # BLD AUTO: 0.03 K/UL (ref 0–0.2)
BASOPHILS NFR BLD: 0.5 % (ref 0–1.9)
BILIRUB SERPL-MCNC: 0.3 MG/DL (ref 0.1–1)
BNP SERPL-MCNC: 12 PG/ML (ref 0–99)
BUN SERPL-MCNC: 10 MG/DL (ref 6–20)
CALCIUM SERPL-MCNC: 8.9 MG/DL (ref 8.7–10.5)
CHLORIDE SERPL-SCNC: 106 MMOL/L (ref 95–110)
CHOLEST SERPL-MCNC: 211 MG/DL (ref 120–199)
CHOLEST/HDLC SERPL: 4.1 {RATIO} (ref 2–5)
CO2 SERPL-SCNC: 25 MMOL/L (ref 23–29)
CREAT SERPL-MCNC: 0.8 MG/DL (ref 0.5–1.4)
CREAT SERPL-MCNC: 0.8 MG/DL (ref 0.5–1.4)
D DIMER PPP IA.FEU-MCNC: 0.44 MG/L FEU
DIFFERENTIAL METHOD BLD: ABNORMAL
EOSINOPHIL # BLD AUTO: 0.1 K/UL (ref 0–0.5)
EOSINOPHIL NFR BLD: 2.4 % (ref 0–8)
ERYTHROCYTE [DISTWIDTH] IN BLOOD BY AUTOMATED COUNT: 15.2 % (ref 11.5–14.5)
EST. GFR  (NO RACE VARIABLE): >60 ML/MIN/1.73 M^2
GLUCOSE SERPL-MCNC: 78 MG/DL (ref 70–110)
HCT VFR BLD AUTO: 36.1 % (ref 37–48.5)
HDLC SERPL-MCNC: 52 MG/DL (ref 40–75)
HDLC SERPL: 24.6 % (ref 20–50)
HGB BLD-MCNC: 10.9 G/DL (ref 12–16)
IMM GRANULOCYTES # BLD AUTO: 0.01 K/UL (ref 0–0.04)
IMM GRANULOCYTES NFR BLD AUTO: 0.2 % (ref 0–0.5)
INR PPP: 1 (ref 0.8–1.2)
LDLC SERPL CALC-MCNC: 141.8 MG/DL (ref 63–159)
LYMPHOCYTES # BLD AUTO: 2.5 K/UL (ref 1–4.8)
LYMPHOCYTES NFR BLD: 44.9 % (ref 18–48)
MCH RBC QN AUTO: 27 PG (ref 27–31)
MCHC RBC AUTO-ENTMCNC: 30.2 G/DL (ref 32–36)
MCV RBC AUTO: 89 FL (ref 82–98)
MONOCYTES # BLD AUTO: 0.5 K/UL (ref 0.3–1)
MONOCYTES NFR BLD: 8.3 % (ref 4–15)
NEUTROPHILS # BLD AUTO: 2.4 K/UL (ref 1.8–7.7)
NEUTROPHILS NFR BLD: 43.7 % (ref 38–73)
NONHDLC SERPL-MCNC: 159 MG/DL
NRBC BLD-RTO: 0 /100 WBC
OHS QRS DURATION: 86 MS
OHS QTC CALCULATION: 449 MS
PLATELET # BLD AUTO: 296 K/UL (ref 150–450)
PMV BLD AUTO: 10.1 FL (ref 9.2–12.9)
POC PTINR: 1.4 (ref 0.9–1.2)
POC PTWBT: 16.1 SEC (ref 9.7–14.3)
POCT GLUCOSE: 76 MG/DL (ref 70–110)
POCT GLUCOSE: 77 MG/DL (ref 70–110)
POTASSIUM SERPL-SCNC: 4.9 MMOL/L (ref 3.5–5.1)
PROT SERPL-MCNC: 8.1 G/DL (ref 6–8.4)
PROTHROMBIN TIME: 11.4 SEC (ref 9–12.5)
RBC # BLD AUTO: 4.04 M/UL (ref 4–5.4)
SAMPLE: ABNORMAL
SAMPLE: NORMAL
SARS-COV-2 RDRP RESP QL NAA+PROBE: NEGATIVE
SODIUM SERPL-SCNC: 137 MMOL/L (ref 136–145)
TRIGL SERPL-MCNC: 86 MG/DL (ref 30–150)
TROPONIN I SERPL DL<=0.01 NG/ML-MCNC: <0.006 NG/ML (ref 0–0.03)
TROPONIN I SERPL DL<=0.01 NG/ML-MCNC: <0.006 NG/ML (ref 0–0.03)
TSH SERPL DL<=0.005 MIU/L-ACNC: 1.19 UIU/ML (ref 0.4–4)
WBC # BLD AUTO: 5.52 K/UL (ref 3.9–12.7)

## 2024-08-02 PROCEDURE — 80053 COMPREHEN METABOLIC PANEL: CPT | Performed by: STUDENT IN AN ORGANIZED HEALTH CARE EDUCATION/TRAINING PROGRAM

## 2024-08-02 PROCEDURE — 82962 GLUCOSE BLOOD TEST: CPT

## 2024-08-02 PROCEDURE — 83880 ASSAY OF NATRIURETIC PEPTIDE: CPT | Performed by: EMERGENCY MEDICINE

## 2024-08-02 PROCEDURE — 85379 FIBRIN DEGRADATION QUANT: CPT | Performed by: STUDENT IN AN ORGANIZED HEALTH CARE EDUCATION/TRAINING PROGRAM

## 2024-08-02 PROCEDURE — 93010 ELECTROCARDIOGRAM REPORT: CPT | Mod: ,,, | Performed by: INTERNAL MEDICINE

## 2024-08-02 PROCEDURE — 84443 ASSAY THYROID STIM HORMONE: CPT | Performed by: STUDENT IN AN ORGANIZED HEALTH CARE EDUCATION/TRAINING PROGRAM

## 2024-08-02 PROCEDURE — 84484 ASSAY OF TROPONIN QUANT: CPT | Performed by: EMERGENCY MEDICINE

## 2024-08-02 PROCEDURE — 99900035 HC TECH TIME PER 15 MIN (STAT)

## 2024-08-02 PROCEDURE — 25500020 PHARM REV CODE 255: Performed by: EMERGENCY MEDICINE

## 2024-08-02 PROCEDURE — U0002 COVID-19 LAB TEST NON-CDC: HCPCS | Performed by: EMERGENCY MEDICINE

## 2024-08-02 PROCEDURE — 99284 EMERGENCY DEPT VISIT MOD MDM: CPT | Mod: FS,,, | Performed by: STUDENT IN AN ORGANIZED HEALTH CARE EDUCATION/TRAINING PROGRAM

## 2024-08-02 PROCEDURE — 80061 LIPID PANEL: CPT | Performed by: STUDENT IN AN ORGANIZED HEALTH CARE EDUCATION/TRAINING PROGRAM

## 2024-08-02 PROCEDURE — 84484 ASSAY OF TROPONIN QUANT: CPT | Mod: 91

## 2024-08-02 PROCEDURE — 85610 PROTHROMBIN TIME: CPT

## 2024-08-02 PROCEDURE — 93005 ELECTROCARDIOGRAM TRACING: CPT

## 2024-08-02 PROCEDURE — 85610 PROTHROMBIN TIME: CPT | Performed by: STUDENT IN AN ORGANIZED HEALTH CARE EDUCATION/TRAINING PROGRAM

## 2024-08-02 PROCEDURE — 94761 N-INVAS EAR/PLS OXIMETRY MLT: CPT

## 2024-08-02 PROCEDURE — 82565 ASSAY OF CREATININE: CPT | Mod: 91

## 2024-08-02 PROCEDURE — 99291 CRITICAL CARE FIRST HOUR: CPT

## 2024-08-02 PROCEDURE — 85025 COMPLETE CBC W/AUTO DIFF WBC: CPT | Performed by: STUDENT IN AN ORGANIZED HEALTH CARE EDUCATION/TRAINING PROGRAM

## 2024-08-02 RX ADMIN — IOHEXOL 100 ML: 350 INJECTION, SOLUTION INTRAVENOUS at 02:08

## 2024-08-02 NOTE — ED PROVIDER NOTES
"Encounter Date: 2024       History     Chief Complaint   Patient presents with    Chest Pain     Tongue feels heavy, slurred speech 20 min before arrival, chest pain first, seen by dr ronen gomez stroke code, van neg confusion     Ms. Lowe is a 49-year-old female with past medical history of HIV (on Genvoya), hypertension, diabetes mellitus type 2 presents to the emergency department today for chest pain and subjective "tongue heaviness." The patient states that these symptoms started today but she is unsure of time at which it started however she says that she woke up normal.  The chest pain started at a 9/10 center of the chest that radiated to the left shoulder.  She had minimal relief with over-the-counter pain patches.  The tongue heaviness started pretty well at the same time as chest pain. she complains of slurred speech but denies dysphagia.  She denies any sick contacts, fever, chills, nausea, vomiting, diarrhea.    The history is provided by the patient.     Review of patient's allergies indicates:  No Known Allergies  Past Medical History:   Diagnosis Date    Asthma     HIV disease     HIV positive     Hypertension      Past Surgical History:   Procedure Laterality Date     SECTION, CLASSIC       Family History   Problem Relation Name Age of Onset    Hypertension Mother      Diabetes Mother      Hypertension Father      Diabetes Father       Social History     Tobacco Use    Smoking status: Every Day    Smokeless tobacco: Never   Substance Use Topics    Alcohol use: No    Drug use: No     Review of Systems    Physical Exam     Initial Vitals [24 1353]   BP Pulse Resp Temp SpO2   (!) 160/100 82 20 98.8 °F (37.1 °C) 98 %      MAP       --         Physical Exam  Gen: AxOx2 (person place), well nourished, appears stated age, no pallor, no jaundice, appears well hydrated  Eye: EOMI, no scleral icterus, no periorbital edema or ecchymosis  Head: Normocephalic, atraumatic, no lesions, scalp " appears normal  ENT: Neck supple, no stridor, no masses, no drooling or voice changes  CVS: All distal pulses intact with normal rate and rhythm, no JVD, normal S1/S2, no murmur  Pulm: Normal breath sounds, no wheezes, rales or rhonchi, no increased work of breathing  Abd:  Nondistended, soft, nontender, no organomegaly, no CVAT  Ext: No edema, no lesions, rashes, or deformity  Neuro: GCS15, moving all extremities, gait intact, face grossly symmetric, normal strength bilateral upper and lower extremities, no sensory deficits, speech is not slurred  Psych: normal affect, cooperative, well groomed, makes good eye contact   ED Course   Critical Care    Date/Time: 8/2/2024 9:44 PM    Performed by: Asad Cheng III, MD  Authorized by: Asad Cheng III, MD  Total critical care time (exclusive of procedural time) : 30 minutes  Critical care time was exclusive of separately billable procedures and treating other patients and teaching time.  Comments:  Patient required numerous evaluations of her cardiopulmonary and neurological status during her course in the emergency department for her life-threatening chest discomfort with slurred speech initially requiring stroke code        Labs Reviewed   CBC W/ AUTO DIFFERENTIAL - Abnormal       Result Value    WBC 5.52      RBC 4.04      Hemoglobin 10.9 (*)     Hematocrit 36.1 (*)     MCV 89      MCH 27.0      MCHC 30.2 (*)     RDW 15.2 (*)     Platelets 296      MPV 10.1      Immature Granulocytes 0.2      Gran # (ANC) 2.4      Immature Grans (Abs) 0.01      Lymph # 2.5      Mono # 0.5      Eos # 0.1      Baso # 0.03      nRBC 0      Gran % 43.7      Lymph % 44.9      Mono % 8.3      Eosinophil % 2.4      Basophil % 0.5      Differential Method Automated     COMPREHENSIVE METABOLIC PANEL - Abnormal    Sodium 137      Potassium 4.9      Chloride 106      CO2 25      Glucose 78      BUN 10      Creatinine 0.8      Calcium 8.9      Total Protein 8.1      Albumin 3.3 (*)     Total  Bilirubin 0.3      Alkaline Phosphatase 74      AST 22      ALT 15      eGFR >60.0      Anion Gap 6 (*)    LIPID PANEL - Abnormal    Cholesterol 211 (*)     Triglycerides 86      HDL 52      LDL Cholesterol 141.8      HDL/Cholesterol Ratio 24.6      Total Cholesterol/HDL Ratio 4.1      Non-HDL Cholesterol 159     ISTAT PROCEDURE - Abnormal    POC PTWBT 16.1 (*)     POC PTINR 1.4 (*)     Sample unknown     PROTIME-INR    Prothrombin Time 11.4      INR 1.0     TSH    TSH 1.186     SARS-COV-2 RNA AMPLIFICATION, QUAL    SARS-CoV-2 RNA, Amplification, Qual Negative     TROPONIN I    Troponin I <0.006     B-TYPE NATRIURETIC PEPTIDE    BNP 12     D DIMER, QUANTITATIVE   TROPONIN I    Troponin I <0.006     D DIMER, QUANTITATIVE    D-Dimer 0.44      Narrative:     ADD ON D WILMAMER, KARLI [phone] PER SAMINA TAYLOR, RIN   16:18  08/02/2024    POCT GLUCOSE    POCT Glucose 77     ISTAT CREATININE    POC Creatinine 0.8      Sample unknown     POCT GLUCOSE    POCT Glucose 76          ECG Results              EKG 12-lead (Final result)        Collection Time Result Time QRS Duration OHS QTC Calculation    08/02/24 14:26:51 08/02/24 15:23:20 86 449                     Final result by Interface, Lab In [facility] (08/02/24 15:23:26)                   Narrative:    Test Reason : R07.9,    Vent. Rate : 085 BPM     Atrial Rate : 085 BPM     P-R Int : 172 ms          QRS Dur : 086 ms      QT Int : 378 ms       P-R-T Axes : 000 -03 -01 degrees     QTc Int : 449 ms    Normal sinus rhythm  Normal ECG  When compared with ECG of 05-JUN-2023 03:22,  Questionable change in The axis  T wave inversion now evident in Inferior leads  Confirmed by Paul AMADOR MD ([id number]) on 8/2/2024 3:23:18 PM    Referred By: [email or web address]   SELF           Confirmed By:Paul AAMDOR MD                                  Imaging Results              X-Ray Chest AP Portable (Final result)  Result time 08/02/24 15:44:21      Final result by Jin Orellana MD  (08/02/24 15:44:21)                   Impression:      1. Interstitial findings are accentuated by habitus, early edema however not excluded, correlation is needed.      Electronically signed by: Jin Orellana MD  Date:    08/02/2024  Time:    15:44               Narrative:    EXAMINATION:  XR CHEST AP PORTABLE    CLINICAL HISTORY:  chest pain;    TECHNIQUE:  Single frontal view of the chest was performed.    COMPARISON:  06/05/2023    FINDINGS:  The cardiomediastinal silhouette is not enlarged, magnified by technique..  There is no pleural effusion.  The trachea is midline.  The lungs are symmetrically expanded bilaterally coarse interstitial attenuation, accentuated by habitus..  No large focal consolidation seen.  There is no pneumothorax.  The osseous structures are unremarkable.                                       CTA STROKE MULTI-PHASE (Final result)  Result time 08/02/24 14:37:19      Final result by Chandana Peacock MD (08/02/24 14:37:19)                   Impression:      No acute abnormality. No high-grade stenosis or major vessel occlusion.      Electronically signed by: Chandana Peacock MD  Date:    08/02/2024  Time:    14:37               Narrative:    EXAMINATION:  CTA STROKE MULTI-PHASE    CLINICAL HISTORY:  Neuro deficit, acute, stroke suspected;    TECHNIQUE:  Non contrast low dose axial images were obtained thought the head. CT angiogram was performed from the level of the conor to the top of the head following the IV administration of 100mL of Omnipaque 350.   Sagittal and coronal reconstructions and maximum intensity projection reconstructions were performed. Arterial stenosis percentages are based on NASCET measurement criteria.  Two additional phases of immediate post-contrast CTA images were performed through the head alone.    CT source data was analyzed using artificial intelligence software for detection of large vessel occlusion in order to enable computer assisted triage notification  "and aid clinical stroke decision making.    COMPARISON:  None    FINDINGS:  Intracranial Compartment:    Ventricles and sulci are normal in size for age without evidence of hydrocephalus. No extra-axial blood or fluid collections.    The brain parenchyma appears normal. No parenchymal mass, hemorrhage, edema, or major vascular distribution infarct.    Skull/Extracranial Contents (limited evaluation): No fracture. Mastoid air cells and paranasal sinuses are essentially clear.    Non-Vascular Structures of the Neck/Thoracic Inlet (limited evaluation): Mucous retention cyst in the right maxillary antrum measuring about 1.4 cm.    Aorta: Normal 3 vessel arch.    Extracranial carotid circulation: No hemodynamically significant stenosis, aneurysmal dilatation, or dissection.  There is tortuosity of the cervical internal carotid arteries.    Extracranial vertebral circulation: No hemodynamically significant stenosis, aneurysmal dilatation, or dissection.  Tortuosity is present in the V2 vertebral segments bilaterally.  Vertebral arteries are codominant.    Intracranial Arteries: No focal high-grade stenosis, occlusion, or aneurysm.    Venous structures (limited evaluation): Normal.                                       Medications   iohexoL (OMNIPAQUE 350) injection 100 mL (100 mLs Intravenous Given 8/2/24 1421)     Medical Decision Making  Initial assessment  Patient is a 49-year-old female with past medical history of HIV (on Genvoya), hypertension, diabetes mellitus type 2 presented to the emergency department for chest pain and "tongue heaviness." Patient is able to vocalise, breathing spontaneously, hemodynamically stable, oriented, moving all 4 limbs spontaneously.      Differential diagnosis includes but is not limited to:  ACS, stroke, aortic dissection, sepsis metabolic derangement, pneumonia.  Most likely diagnosis is ACS.      ED management  Patient initially arrived as a stroke code and has no focal deficits " and an NIH score of 1.  No LVO, hemorrhage, masses seen on CT/CTA.  Stroke labs were ordered.  With the lack of focal deficits on exam, the MRI of the head will be withheld.    The patient's symptoms started roughly this early afternoon about an hour to 2 hours before arrival.  Studies to rule out ACS, PE, pneumothorax.  With this chest pain onset being so close to arrival a 2nd troponin will be ordered at the 1st is negative.    Reassessment 1:  Patient is resting comfortably in bed.  Symptoms are still resolved.  Negative D-dimer, 1st troponin and BNP.    Disposition:  Pain my next reassessment patient is insistent about going home.  Patient was offered to stay and wait for the results of her 2nd troponin, however she would like to return home as she has been asymptomatic over the last few hours.  Patient will be discharged to home.  She will be told to return if symptoms return or get worse.    Second troponin was negative.    Amount and/or Complexity of Data Reviewed  Labs: ordered. Decision-making details documented in ED Course.  Radiology: ordered and independent interpretation performed. Decision-making details documented in ED Course.  ECG/medicine tests: ordered and independent interpretation performed. Decision-making details documented in ED Course.  Discussion of management or test interpretation with external provider(s):  Stroke neurology evaluated patient    Risk  Prescription drug management.      Additional MDM:     Well's Criteria Score:  -Clinical symptoms of DVT (leg swelling, pain with palpation) = 0.0  -PE is #1 diagnosis OR equally likely =            0.0  -Heart Rate >100 =   0.0  -Immobilization (= or > than 3 days) or surgery in the previous 4 weeks = 0.0  -Previous DVT/PE = 0.0  -Hemoptysis =          0.0  -Malignancy =           0.0  Well's Probability Score =    0      Heart Score:    History:          Moderately suspicious.  ECG:             Normal  Age:               45-65 years  Risk  factors: 1-2 risk factors  Troponin:       Less than or equal to normal limit  Heart Score = 3               Attending Attestation:   Physician Attestation Statement for Resident:  As the supervising MD   Physician Attestation Statement: I have personally seen and examined this patient.   I agree with the above history.  -:  Atypical chest pain, slurred speech   As the supervising MD I agree with the above PE.     As the supervising MD I agree with the above treatment, course, plan, and disposition.                    ED Course as of 08/02/24 2141   Fri Aug 02, 2024   1513 CBC W/ AUTO DIFFERENTIAL(!)  As per my interpreted, Mild anemia with hemoglobin at 10.9, normal white blood cell count  [HJ]   1515 CTA STROKE MULTI-PHASE  As per my interpretation,  No ring enhancing lesions, hemorrhage or masses seen on imaging [HJ]   1523 D dimer, quantitative [HJ]   1555 Comprehensive metabolic panel(!)  Per my interpretation CMP is largely unremarkable [HJ]   1559 X-Ray Chest AP Portable  As per my interpretation, no pneumothorax or  consolidations.  No fractures appreciated, cardiac silhouette is normal, no widening of the aortic knob or blunting of the costophrenic angles   [HJ]   1600 EKG 12-lead  As per my interpretation normal sinus rhythm rate of 85, no STEMI [HJ]   1601 Troponin I  Normal 1st troponin however with her chest pain being shortly before arrival, a 2nd troponin will be checked [HJ]   1602 LDL - Lipid Panel(!)  Mild elevation in total cholesterol [HJ]   1640 D-Dimer: 0.44  Normal D-dimer [HJ]   1810 Troponin I #2  With 2 consecutive negative troponins, negative symptoms, and no EKG changes, it is unlikely that the patient is having an MI. [HJ]      ED Course User Index  [HJ] Jonathon Dooley MD                             Clinical Impression:  Final diagnoses:  [R29.818] Acute focal neurological deficit  [R07.9] Chest pain (Primary)          ED Disposition Condition    Discharge Stable          ED  Prescriptions    None       Follow-up Information       Follow up With Specialties Details Why Contact Info    [facility] - Emergency Dept Emergency Medicine Go to  If symptoms worsen [address]  [facility] [id number] [phone]             Jonathon Dooley MD  Resident  08/02/24 2132       Asad Cheng III, MD  08/02/24 [id number]

## 2024-08-02 NOTE — SUBJECTIVE & OBJECTIVE
"      Past Medical History:   Diagnosis Date    Asthma     HIV disease     HIV positive     Hypertension      Past Surgical History:   Procedure Laterality Date     SECTION, CLASSIC       Social History     Tobacco Use    Smoking status: Every Day    Smokeless tobacco: Never   Substance Use Topics    Alcohol use: No    Drug use: No     Review of patient's allergies indicates:  No Known Allergies    Medications: I have reviewed the current medication administration record.    (Not in a hospital admission)      Review of Systems   Cardiovascular:  Positive for chest pain.   Neurological:  Positive for speech difficulty. Negative for facial asymmetry, weakness and numbness.     Objective:     Vital Signs (Most Recent):  Temp: 98.8 °F (37.1 °C) (24 1353)  Pulse: 82 (24 1432)  Resp: (!) 23 (24 1432)  BP: (!) 146/81 (24 1432)  SpO2: 99 % (24 1432)    Vital Signs Range (Last 24H):  Temp:  [98.8 °F (37.1 °C)]   Pulse:  [82]   Resp:  [20-23]   BP: (146-160)/()   SpO2:  [98 %-99 %]        Physical Exam  Vitals reviewed.   Constitutional:       General: She is not in acute distress.  HENT:      Head: Normocephalic and atraumatic.   Cardiovascular:      Rate and Rhythm: Normal rate.   Pulmonary:      Effort: Pulmonary effort is normal. No respiratory distress.   Skin:     General: Skin is warm and dry.   Neurological:      Mental Status: She is alert.              Neurological Exam:   LOC: alert  Attention Span: Good   Language: No aphasia  Articulation: No dysarthria  Orientation: Not oriented to place, Not oriented to time  Visual Fields: Full  EOM (CN III, IV, VI): Full/intact  Facial Movement (CN VII): Symmetric facial expression    Motor: Arm left  Normal 5/5  Leg left  Normal 5/5  Arm right  Normal 5/5  Leg right Normal 5/5  Sensation: Intact to light touch, temperature and vibration  Tone: Normal tone throughout      Laboratory:  CMP: No results for input(s): "GLUCOSE", " ""CALCIUM", "ALBUMIN", "PROT", "NA", "K", "CO2", "CL", "BUN", "CREATININE", "ALKPHOS", "ALT", "AST", "BILITOT" in the last 24 hours.  CBC:   Recent Labs   Lab 08/02/24  1431   WBC 5.52   RBC 4.04   HGB 10.9*   HCT 36.1*      MCV 89   MCH 27.0   MCHC 30.2*     Lipid Panel: No results for input(s): "CHOL", "LDLCALC", "HDL", "TRIG" in the last 168 hours.  Coagulation:   Recent Labs   Lab 08/02/24  1417   INR 1.4*     Hgb A1C: No results for input(s): "HGBA1C" in the last 168 hours.  TSH: No results for input(s): "TSH" in the last 168 hours.    Diagnostic Results:      Brain imaging/Vessel Imaging:  CTA Head and Neck 8/2/24  Impression:     No acute abnormality. No high-grade stenosis or major vessel occlusion.    Cardiac Evaluation:   N/A    "

## 2024-08-02 NOTE — HPI
Lexy Lowe is a 49 y.o. female with PMHx of HIV, HTN, obesity, and tobacco use who presented to ED with tongue heaviness and chest pain. Stroke code activated. Patient LKN today. Reports she did not have symptoms when she woke up this morning. She does not know what time she woke up and does not know what time her symptoms started.

## 2024-08-02 NOTE — CONSULTS
[facility] Emergency Dept  Vascular Neurology  [facility]  Consult Note    Inpatient consult to Vascular (Stroke) Neurology  Consult performed by: Christi Durán PA-C  Consult ordered by: Eric Villegas MD        Assessment/Plan:     Patient is a 49 y.o. year old female with:    Difficulty with speech  Lexy Lowe is a 49 y.o. female with PMHx of HIV, HTN, obesity, and tobacco use who presented to ED with tongue heaviness and chest pain. Stroke code activated. Patient LKN today. Reports she did not have symptoms when she woke up this morning. She does not know what time she woke up and does not know what time her symptoms started.    CT without acute changes. CTA without LVO or significant stenosis. Speech is slow and delayed on exam, patient reports dysarthria. No focal neurologic deficits on exam. TNK deferred due to unknown LKN and nondisabling symptoms. At this time, there is low suspicion for cerebrovascular origin for patient's symptoms. If concern for stroke persists, recommend MRI brain. Discussed patient with staff. Please contact [phone] with any questions or concerns.         STROKE DOCUMENTATION     Acute Stroke Times   Last Known Normal Date: 08/02/24  Last Known Normal Time: 1330  Unknown Normal Time: Unknown Time  Symptom Onset Date: 08/02/24  Unknown Symptom Onset Time: Unknown Time  Stroke Team Called Date: 08/02/24  Stroke Team Called Time: 1404  Stroke Team Arrival Date: 08/02/24  Stroke Team Arrival Time: 1411  CT Interpretation Time: 1419  Thrombolytic Therapy Recommended: No  CTA Interpretation Time: 1421  Thrombectomy Recommended: No    NIH Scale:  1a. Level of Consciousness: 0-->Alert, keenly responsive  1b. LOC Questions: 1-->Answers one question correctly  1c. LOC Commands: 0-->Performs both tasks correctly  2. Best Gaze: 0-->Normal  3. Visual: 0-->No visual loss  4. Facial Palsy: 0-->Normal symmetrical movements  5a. Motor Arm, Left: 0-->No drift, limb holds  90 (or 45) degrees for full 10 secs  5b. Motor Arm, Right: 0-->No drift, limb holds 90 (or 45) degrees for full 10 secs  6a. Motor Leg, Left: 0-->No drift, leg holds 30 degree position for full 5 secs  6b. Motor Leg, Right: 0-->No drift, leg holds 30 degree position for full 5 secs  7. Limb Ataxia: 0-->Absent  8. Sensory: 0-->Normal, no sensory loss  9. Best Language: 0-->No aphasia, normal  10. Dysarthria: 0-->Normal  11. Extinction and Inattention (formerly Neglect): 0-->No abnormality  Total (NIH Stroke Scale): 1    Modified [place] Eagle Score: 0  Priti Coma Scale:    ABCD2 Score:    [id number] Score:   HAS -BLED Score:   ICH Score:   Hunt & Ortega Classification:       Thrombolysis Candidate? No, Non-disabling symptoms - Low NIHSS     Delays to Thrombolysis?  Not Applicable    Interventional Revascularization Candidate?   Is the patient eligible for mechanical endovascular reperfusion (DEQUAN)?  No; No large vessel occlusion identified on imaging , No; no significant neurologic deficit (NIHSS <6) , and No; at this time symptoms not suggestive of large vessel occlusion    Delays to Thrombectomy? Not Applicable    Hemorrhagic change of an Ischemic Stroke: Does this patient have an ischemic stroke with hemorrhagic changes? No     Subjective:     History of Present Illness:  Lexy Lowe is a 49 y.o. female with PMHx of HIV, HTN, obesity, and tobacco use who presented to ED with tongue heaviness and chest pain. Stroke code activated. Patient LKN today. Reports she did not have symptoms when she woke up this morning. She does not know what time she woke up and does not know what time her symptoms started.          Past Medical History:   Diagnosis Date    Asthma     HIV disease     HIV positive     Hypertension      Past Surgical History:   Procedure Laterality Date     SECTION, CLASSIC       Social History     Tobacco Use    Smoking status: Every Day    Smokeless tobacco: Never   Substance Use Topics     "Alcohol use: No    Drug use: No     Review of patient's allergies indicates:  No Known Allergies    Medications: I have reviewed the current medication administration record.    (Not in a hospital admission)      Review of Systems   Cardiovascular:  Positive for chest pain.   Neurological:  Positive for speech difficulty. Negative for facial asymmetry, weakness and numbness.     Objective:     Vital Signs (Most Recent):  Temp: 98.8 °F (37.1 °C) (08/02/24 1353)  Pulse: 82 (08/02/24 1432)  Resp: (!) 23 (08/02/24 1432)  BP: (!) 146/81 (08/02/24 1432)  SpO2: 99 % (08/02/24 1432)    Vital Signs Range (Last 24H):  Temp:  [98.8 °F (37.1 °C)]   Pulse:  [82]   Resp:  [20-23]   BP: (146-160)/()   SpO2:  [98 %-99 %]        Physical Exam  Vitals reviewed.   Constitutional:       General: She is not in acute distress.  HENT:      Head: Normocephalic and atraumatic.   Cardiovascular:      Rate and Rhythm: Normal rate.   Pulmonary:      Effort: Pulmonary effort is normal. No respiratory distress.   Skin:     General: Skin is warm and dry.   Neurological:      Mental Status: She is alert.              Neurological Exam:   LOC: alert  Attention Span: Good   Language: No aphasia  Articulation: No dysarthria  Orientation: Not oriented to place, Not oriented to time  Visual Fields: Full  EOM (CN III, IV, VI): Full/intact  Facial Movement (CN VII): Symmetric facial expression    Motor: Arm left  Normal 5/5  Leg left  Normal 5/5  Arm right  Normal 5/5  Leg right Normal 5/5  Sensation: Intact to light touch, temperature and vibration  Tone: Normal tone throughout      Laboratory:  CMP: No results for input(s): "GLUCOSE", "CALCIUM", "ALBUMIN", "PROT", "NA", "K", "CO2", "CL", "BUN", "CREATININE", "ALKPHOS", "ALT", "AST", "BILITOT" in the last 24 hours.  CBC:   Recent Labs   Lab 08/02/24  1431   WBC 5.52   RBC 4.04   HGB 10.9*   HCT 36.1*      MCV 89   MCH 27.0   MCHC 30.2*     Lipid Panel: No results for input(s): "CHOL", " ""LDLCALC", "HDL", "TRIG" in the last 168 hours.  Coagulation:   Recent Labs   Lab 08/02/24  1417   INR 1.4*     Hgb A1C: No results for input(s): "HGBA1C" in the last 168 hours.  TSH: No results for input(s): "TSH" in the last 168 hours.    Diagnostic Results:      Brain imaging/Vessel Imaging:  CTA Head and Neck 8/2/24  Impression:     No acute abnormality. No high-grade stenosis or major vessel occlusion.    Cardiac Evaluation:   N/A      Christi Durán PA-C  [facility]  Department of Vascular Neurology   [facility]   "

## 2024-08-02 NOTE — ASSESSMENT & PLAN NOTE
Lexy Lowe is a 49 y.o. female with PMHx of HIV, HTN, obesity, and tobacco use who presented to ED with tongue heaviness and chest pain. Stroke code activated. Patient LKN today. Reports she did not have symptoms when she woke up this morning. She does not know what time she woke up and does not know what time her symptoms started.    CT without acute changes. CTA without LVO or significant stenosis. Speech is slow and delayed on exam, patient reports dysarthria. No focal neurologic deficits on exam. TNK deferred due to unknown LKN and nondisabling symptoms. At this time, there is low suspicion for cerebrovascular origin for patient's symptoms. If concern for stroke persists, recommend MRI brain. Discussed patient with staff. Please contact [phone] with any questions or concerns.

## 2024-08-02 NOTE — ED NOTES
Patient removed monitoring and is ready to leave. Rn explained to patient that she is waiting until 5pm to draw last troponin and explained that this is a time sensitive test. MD notified

## 2024-08-02 NOTE — ED TRIAGE NOTES
Lexy Lowe, a 49 y.o. female presents to the ED w/ complaint of subjective tongue heaviness and chest pain    Triage note:  Chief Complaint   Patient presents with    Chest Pain     Tongue feels heavy, slurred speech 20 min before arrival, chest pain first, seen by dr hardwick call stroke code, van neg confusion     Review of patient's allergies indicates:  No Known Allergies  Past Medical History:   Diagnosis Date    Asthma     HIV disease     HIV positive     Hypertension

## 2024-08-02 NOTE — ED NOTES
"Patient states she sees things people do not see, patient states "whatever we gave her fixed her" patient has only received iv contrast for CT scan. MD at bedside for assessment. Patient tearful and says she has issues going on right now.   "

## 2024-08-02 NOTE — DISCHARGE INSTRUCTIONS
You were seen at the emergency department for your chest pain and tongue heaviness.  Please return to this emergency department if symptoms worsen

## 2024-08-02 NOTE — ED NOTES
Patient says she has 2 teenage girls that drive her crazy, one with autism. Patient says something happened that brought this on